# Patient Record
Sex: FEMALE | Race: WHITE | NOT HISPANIC OR LATINO | Employment: STUDENT | URBAN - METROPOLITAN AREA
[De-identification: names, ages, dates, MRNs, and addresses within clinical notes are randomized per-mention and may not be internally consistent; named-entity substitution may affect disease eponyms.]

---
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
## 2025-04-15 NOTE — PROGRESS NOTES
[facility]  Faraz Moyer  2005    Faraz Moyer is a 19 y.o. female presenting for follow-up of   1. Dysautonomia    2. Palpitations    3. Orthostatic dizziness    4. Chest pain in patient younger than 17 years        HPI  Faraz Moyer was initially sent for cardiac evaluation in June of 2018 for tachycardia.  She was diagnosed with syncope, orthostatic dizziness. There was a lapse in care. She was last seen 9/11/24. She reported tachycardia 1 year prior and OSH holter was normal at that time. She reported continued palpations, chest pain, dizziness, visual disturbances, nausea, brain fog, palpitations, shortness of breath, dry eyes and mouth, constipation, and difficulty sleeping. She was drinking a lot of caffeine. No murmur noted. EKG with low voltage. Dysautonomia protocol was reviewed. She was given a 6 month follow up.     She saw rheumatology for postive BAM In 2021 . She has not followed up with rheumatology.    She reports 1.5 months ago, she developed knees swelling and whelps.  She has a lot of pain in her knees when this occurs.  The swelling was followed by bruising. The swelling will spread from her upper legs to her feet.  Her PCP has started her on HCTZ 12.5 daily. She has had swelling since being on HCTZ. Will refer back to rheumatology.     Faraz has been doing well since last visit.  She is droing much better from a dysautonomia standpoint. She had cut back on caffeine. She is drinking more water.  Faraz has a lot of energy and does not get short of breath with activity.  Denies any recent illness, surgeries, or hospitalizations.    There are no reports of chest pain, chest pain with exertion, cyanosis, exercise intolerance, dyspnea, fatigue, palpitations, syncope, and tachypnea. No other cardiovascular or medical concerns are reported.      Medications:   Medication List with Changes/Refills   Current Medications    DESOGESTREL-ETHINYL ESTRADIOL (APRI)  0.15-0.03 MG PER TABLET    Take 1 tablet by mouth once daily.    HYDROCHLOROTHIAZIDE 12.5 MG TAB    Take 12.5 mg by mouth daily as needed.    LORATADINE (CLARITIN) 10 MG TABLET    Take 10 mg by mouth once daily.    LORATADINE-D 5-120 MG PER TABLET    Take 1 tablet by mouth 2 (two) times daily as needed.    MONTELUKAST (SINGULAIR) 10 MG TABLET    Take 10 mg by mouth once daily.    OMEPRAZOLE (PRILOSEC) 40 MG CAPSULE    Take 40 mg by mouth. Taking at night   Discontinued Medications    ADDERALL XR 10 MG 24 HR CAPSULE    Take 10 capsules by mouth once daily.    CLONIDINE (CATAPRES) 0.2 MG TABLET    Take 1 tablet by mouth nightly as needed.    DOC-Q-LACE 100 MG CAPSULE    Take 100 mg by mouth once daily.      Allergies:   Review of patient's allergies indicates:   Allergen Reactions    Azithromycin     Doxycycline     Erythromycin     Peanut     Shrimp      Not allergic to shrimp it was the seasoning    Augmentin [amoxicillin-pot clavulanate] Itching and Rash    Hydrocodone Nausea And Vomiting and Rash    Strattera [atomoxetine] Hives and Rash     Family History   Problem Relation Name Age of Onset    Anemia Mother      Childhood respiratory disease Mother          asthma    Congenital heart disease Mother          murmur    Premature birth Mother          34 weeks    Diabetes Mother      Childhood respiratory disease Sister          asthma    Premature birth Sister          31 weeks    Seizures Sister      Arrhythmia Maternal Aunt          POTS, EP study    Anemia Maternal Grandmother      Arrhythmia Maternal Grandmother          tachycardia and A-fib    Heart attacks under age 50 Maternal Grandmother      Hypertension Maternal Grandmother      Anemia Maternal Grandfather      Arrhythmia Maternal Grandfather          irregular heart beat took medicine    Hypertension Maternal Grandfather      Hypertension Paternal Grandfather      Cardiomyopathy Neg Hx      Clotting disorder Neg Hx      Deafness Neg Hx      Early death  "Neg Hx      Long QT syndrome Neg Hx      Pacemaker/defibrilator Neg Hx      SIDS Neg Hx       Past Medical History:   Diagnosis Date    ADD (attention deficit disorder)     BAM positive 2021    W/O evidence of autoimmune disease    Asthma     Chest pain     Dysautonomia     Fibromyalgia syndrome 2021    Hyperlipidemia     Murmur 2025    Orthostatic dizziness     Palpitations     Respiratory syncytial virus (RSV)     Syncope, vasovagal 2015    x5, starting at age 9     Social History     Social History Narrative    Faraz lives with mom and step dad.       Past Surgical History:   Procedure Laterality Date    COLONOSCOPY  2018    TONSILLECTOMY, ADENOIDECTOMY      TYMPANOSTOMY TUBE PLACEMENT      TYMPANOSTOMY TUBE PLACEMENT      TYMPANOSTOMY TUBE PLACEMENT       Birth History    Birth     Length: 1' 8.25" (0.514 m)     Weight: 3.6 kg (7 lb 15 oz)    Delivery Method: , Unspecified    Gestation Age: 39 wks     Nucchal cord, decelerations.  Emergency        There is no immunization history on file for this patient.  Immunizations were reviewed today and if not current, recommend follow up with the PCP for further management.  Past medical history, family history, surgical history, social history updated and reviewed today.     Review of Systems  GENERAL: No fever, chills, fatigability, malaise, or weight loss.  CHEST: Denies MALDONADO, cyanosis, wheezing, cough, sputum production, or SOB.  CARDIOVASCULAR: Denies chest pain, palpitations, diaphoresis, SOB, or reduced exercise tolerance.  Endocrine: Denies polyphagia, polydipsia, or polyuria  Skin:+ rash,  Denies ror color change  HENT: Negative for congestion, headaches and sore throat.   ABDOMEN: Appetite fine. No weight loss. Denies diarrhea, abdominal pain, nausea, or vomiting.  PERIPHERAL VASCULAR: No edema, varicosities, or cyanosis.  Musculoskeletal: + joint swelling Negative for muscle weakness and stiffness.  NEUROLOGIC: " "no dizziness, no history of syncope by report, no headache   Psychiatric/Behavioral: Negative for altered mental status. The patient is not nervous/anxious.   Allergic/Immunologic: Negative for environmental allergies.   : dysuria, hematuria, polyuria    Objective:   /60 (BP Location: Right arm, Patient Position: Sitting)   Pulse 76   Resp 18   Ht 5' 2" (1.575 m)   Wt 56.7 kg (124 lb 14.3 oz)   SpO2 97%   BMI 22.84 kg/m²   Body surface area is 1.57 meters squared.  Blood pressure %jessica are not available for patients who are 18 years or older.    Physical Exam  GENERAL: Awake, well-developed well-nourished, no apparent distress  HEENT: mucous membranes moist and pink, normocephalic, no cranial or carotid bruits, sclera anicteric  NECK:  no lymphadenopathy  CHEST: Good air movement, clear to auscultation bilaterally  CARDIOVASCULAR: Quiet precordium, regular rate and rhythm, single S1, split S2, normal P2, No S3 or S4, no rubs or gallops. No clicks or rumbles. No cardiomegaly by palpation.Grade 1 /6 possible regurgitant murmur noted at the apex. There is a grade 1/6 diastolic murmur at the ULSB- possible AI vs PI  ABDOMEN: Soft, nontender nondistended, no hepatosplenomegaly, no aortic bruits  EXTREMITIES: Warm well perfused, 2+ radial/pedal/femoral pulses, capillary refill 2 seconds, no clubbing, cyanosis, or edema  NEURO: Alert and oriented, cooperative with exam, face symmetric, moves all extremities well.  Skin: pink, turgor WNL  Vitals reviewed     Tests:   9/11/25 EKG interpretation by Dr. Kim reveals:   Sinus rhythm with marked sinus arrythmia    rS V1   Low voltage V leads   Abnormal ECG   (Final report in electronic medical record)    Echocardiogram:   Pertinent findings from the Echo dated 7/26/2018 are:   Normal segmental anatomy.  Normal biventricular size and qualitatively normal systolic function.  No obvious atrial septal defect, ventricular septal defect, or patent ductus  arteriosus.  No " significant valvular stenosis or regurgitation.  No evidence of aortic coarctation.  No pericardial effusion.  (Full report in electronic medical record)    Assessment:  Patient Active Problem List   Diagnosis    Syncope, vasovagal    Rash    Joint swelling    Murmur    Abnormal EKG       Discussion/ Plan:   Dr. Kim reviewed history and physical exam. He then performed the physical exam. He discussed the findings with the patient's caregiver(s), and answered all questions. Dr. Kim and I have reviewed our general guidelines related to cardiac issues with the family.  I instructed them in the event of an emergency to call 911 or go to the nearest emergency room.  They know to contact the PCP if problems arise or if they are in doubt.    Her exam today is suspect with new murmurs. There is questionable MR at the apex. There is also a murmur suspect for AI vs PI. Will do an echo for evaluation. Dr. Kim will walk back and look at echo when she is here and determine if she needs SIE.    EKG with low voltage at last visit. Will do an echo. Will repeat EKG at next visit.     She saw rheumatology for postive BAM. In 2021 . She has not followed up with rheumatology.She reports 1.5 months ago, she developed knees swelling and whelps.  She has a lot of pain in her knees when this occurs. The swelling was followed by bruising. The swelling will spread from her upper legs to her feet.  Her PCP has started her on HCTZ 12.5 daily. She has had swelling since being on HCTZ. Will refer back to rheumatology.     We have discussed dysautonomia at length today which is well controlled.  Dysautonomia is a term used to describe a multitude of symptoms that can occur with dysfunction of the autonomic nervous system. The autonomic nervous system serves as the main communication link between the brain and the organs without conscious effort. There are different types of dysautonomia including postural orthostatic tachycardia syndrome  (POTS), orthostatic hypotension (OH), and myalgic encephalomyelitis (ME) which is also known as chronic fatigue syndrome (CFS). Dysautonomia causes many symptoms that vary from person to person and can range in severity.  Common symptoms include: severe dizziness and fainting, headaches, severe fatigue, difficulty with concentration, heat or cold intolerance, palpitations, chest pain, weakness, venous pooling, nausea, vomiting, abdominal discomfort, and joint/muscle pain.  In part, these symptoms can be managed with a combination of non-pharmacologic interventions, including ensuring adequate fluid and salt intake, not skipping meals, limiting caffeine, self-limiting activities, and medications. There is nothing magic that we can do to make all of the symptoms go away.  The hope is to reduce symptoms so that important things such as the activities of daily living and education may be easier. It is important to know that symptoms may vary from hour to hour, day to day, throughout the month (especially for females), and throughout the year. Symptoms may abruptly start and are sometimes triggered by illnesses such as mono or flu.  Different people can have different combinations of symptoms. It is important to keep a daily log including fluid intake and symptoms. Protocol and guidelines were reviewed and include no dark water swimming without a life vest, no clear water swimming without a life vest and/or strict  and/or adult supervision.  If syncope or presyncope is experienced, they are to resume a position of comfort, either sitting or laying down.  I also suggested they elevate their feet 6 inches above their head.     Dysautonomia symptoms can be controlled by using a combination of medications and nonpharmacologic treatments which include:  Drink 80+ ounces of fluid (tap water, Propel, Gatorade, G2, Powerade, Powerade zero, Splash) each day, and have a salty snack (pretzels, saltines, pickles).    Dont  skip meals. Recommend eating 5-6 small meals a day.  Avoid large meals that contain a lot of carbohydrates which may exacerbate your symptoms.   No caffeine (its a diuretic, so it makes you urinate and empty your tank of fluid)  Raise the head of your bed 4-6 inches on something firm to help reduce dizziness in the morning when you get up  Insomnia can be treated with good sleep habits:  Lower the lights one hour before bedtime  Do a relaxing activity, such as reading under low light, massage, meditation, yoga, stretching, or a warm bath.    Turn off the television, computer and video games, and stop cell phone use.  When it is time for bed, the room should be dark (no night lights) and cool, but not cold.  Avoid triggers that worsen symptoms:  Have a consistent bedtime and amount of sleep (10-14 hours for adolescents).  Avoid extreme heat or cold.  Avoid stressful situations if possible  Wear compression stockings (30-40 mmHg) which should extend from waist to toe. They should be worn during awake hours.  A cooling vest is a vest with gel inserts that can be cooled in the freezer, then inserted into the vest and worn when it is hot outside.  There are also evaporative cooling vests, as well.  Patients who cannot tolerate the heat often appreciate these.     Coping with a chronic disease is stressful.  Many families find it helpful to see a mental health provider.    Participating in exercise is critical to the successful management of dysautonomia, and to the long-term improvement and resolution of symptoms.  Start with a small amount of leg and core strengthening exercise, such as 5 minutes/day, and increasing by 5 minutes/day every week up to 30 to 60 minutes/day. Despite possible initial worsening of symptoms and decreased overall energy, we recommend to try to push through as best as possible.  If needed, you may take a two-day break, and then resume exercise at a lower duration and/or intensity, and work  back up to following the protocol. Again, this is a vital part of the program and is important for you to follow.  Failure to exercise regularly makes it difficult for us to help you manage your  symptoms and may contribute to ongoing problems and quality of life.     Caregiver instructed to call one week after testing for results. Caregiver expressed understanding.     I spent a total of 30 minutes on the day of the visit.  This includes face to face time and non-face to face time preparing to see the patient (eg, review of tests), obtaining and/or reviewing separately obtained history, documenting clinical information in the electronic or other health record, independently interpreting results and communicating results to the patient/family/caregiver, or care coordinator.     Activity:She can participate in normal age-appropriate activities. She should be allowed to set .his own pace and rest if fatigued. If Faraz becomes lightheaded or feels as if she may pass out, patient should assume a position of comfort immediately (sit down or lie down) until the feeling passes. Do not make them walk somewhere to sit down. Please allow the patient to drink 60-100oz of fluids (Gatorade/Powerade/tap water/Splash/Propel) and eat salty snacks throughout the day (both at home and at school) to minimize the likeliness of dizziness. Please allow frequent bathroom breaks due to increased fluid intake. There should be no dark water swimming without a life vest and there should be no clear water swimming without adult or  supervision.     No endocarditis prophylaxis is recommended in this circumstance PENDING ECHO      Medications:   Medication List with Changes/Refills   Current Medications    DESOGESTREL-ETHINYL ESTRADIOL (APRI) 0.15-0.03 MG PER TABLET    Take 1 tablet by mouth once daily.    HYDROCHLOROTHIAZIDE 12.5 MG TAB    Take 12.5 mg by mouth daily as needed.    LORATADINE (CLARITIN) 10 MG TABLET    Take 10 mg by  mouth once daily.    LORATADINE-D 5-120 MG PER TABLET    Take 1 tablet by mouth 2 (two) times daily as needed.    MONTELUKAST (SINGULAIR) 10 MG TABLET    Take 10 mg by mouth once daily.    OMEPRAZOLE (PRILOSEC) 40 MG CAPSULE    Take 40 mg by mouth. Taking at night   Discontinued Medications    ADDERALL XR 10 MG 24 HR CAPSULE    Take 10 capsules by mouth once daily.    CLONIDINE (CATAPRES) 0.2 MG TABLET    Take 1 tablet by mouth nightly as needed.    DOC-Q-LACE 100 MG CAPSULE    Take 100 mg by mouth once daily.         Orders placed this encounter  Orders Placed This Encounter   Procedures    Ambulatory referral/consult to Rheumatology    EKG 12-lead    Pediatric Echo Limited Echo? No       Follow-Up:   Return to clinic in 1 year with EKG pending echo or sooner if there are any concerns    Sincerely,  Jay Kim MD    Note Contributing Authors:  MD Megan Blevins PA-C  04/17/2025    Attestation: Jay Kim MD  I have reviewed the records and agree with the above. I have examined the patient and discussed the findings with the family in attendance. All questions were answered to their satisfaction. I agree with the plan and the follow up instructions.

## 2025-04-17 ENCOUNTER — CLINICAL SUPPORT (OUTPATIENT)
Dept: PEDIATRIC CARDIOLOGY | Facility: CLINIC | Age: 20
End: 2025-04-17
Payer: MEDICAID

## 2025-04-17 ENCOUNTER — OFFICE VISIT (OUTPATIENT)
Dept: PEDIATRIC CARDIOLOGY | Facility: CLINIC | Age: 20
End: 2025-04-17
Payer: MEDICAID

## 2025-04-17 VITALS
HEART RATE: 76 BPM | RESPIRATION RATE: 18 BRPM | DIASTOLIC BLOOD PRESSURE: 60 MMHG | SYSTOLIC BLOOD PRESSURE: 102 MMHG | BODY MASS INDEX: 22.98 KG/M2 | WEIGHT: 124.88 LBS | OXYGEN SATURATION: 97 % | HEIGHT: 62 IN

## 2025-04-17 DIAGNOSIS — M25.40 JOINT SWELLING: ICD-10-CM

## 2025-04-17 DIAGNOSIS — R55 SYNCOPE, VASOVAGAL: ICD-10-CM

## 2025-04-17 DIAGNOSIS — R01.1 MURMUR: ICD-10-CM

## 2025-04-17 DIAGNOSIS — R94.31 ABNORMAL EKG: ICD-10-CM

## 2025-04-17 DIAGNOSIS — M25.40 JOINT SWELLING: Primary | ICD-10-CM

## 2025-04-17 DIAGNOSIS — R94.31 ABNORMAL EKG: Primary | ICD-10-CM

## 2025-04-17 DIAGNOSIS — R21 RASH: ICD-10-CM

## 2025-04-17 PROBLEM — R00.2 PALPITATIONS: Status: RESOLVED | Noted: 2018-07-26 | Resolved: 2025-04-17

## 2025-04-17 PROBLEM — R07.9 CHEST PAIN IN PATIENT YOUNGER THAN 17 YEARS: Status: RESOLVED | Noted: 2018-07-26 | Resolved: 2025-04-17

## 2025-04-17 PROBLEM — R42 ORTHOSTATIC DIZZINESS: Status: RESOLVED | Noted: 2018-07-26 | Resolved: 2025-04-17

## 2025-04-17 PROCEDURE — 99214 OFFICE O/P EST MOD 30 MIN: CPT | Mod: S$GLB,,, | Performed by: PHYSICIAN ASSISTANT

## 2025-04-17 PROCEDURE — 3008F BODY MASS INDEX DOCD: CPT | Mod: CPTII,S$GLB,, | Performed by: PHYSICIAN ASSISTANT

## 2025-04-17 PROCEDURE — 1160F RVW MEDS BY RX/DR IN RCRD: CPT | Mod: CPTII,S$GLB,, | Performed by: PHYSICIAN ASSISTANT

## 2025-04-17 PROCEDURE — 1159F MED LIST DOCD IN RCRD: CPT | Mod: CPTII,S$GLB,, | Performed by: PHYSICIAN ASSISTANT

## 2025-04-17 PROCEDURE — 3078F DIAST BP <80 MM HG: CPT | Mod: CPTII,S$GLB,, | Performed by: PHYSICIAN ASSISTANT

## 2025-04-17 PROCEDURE — 3074F SYST BP LT 130 MM HG: CPT | Mod: CPTII,S$GLB,, | Performed by: PHYSICIAN ASSISTANT

## 2025-04-17 RX ORDER — HYDROCHLOROTHIAZIDE 12.5 MG/1
12.5 TABLET ORAL DAILY PRN
COMMUNITY
Start: 2025-04-08

## 2025-04-17 NOTE — PATIENT INSTRUCTIONS
Jay Kim MD  Pediatric Cardiology  [address] [place] [address]  [phone]    General Guidelines    Name: Faraz Moyer                   : 2005    Diagnosis:   1. Joint swelling    2. Rash    3. Murmur        PCP: Tomás Miranda MD  PCP Phone Number: [phone]    If you have an emergency or you think you have an emergency, go to the nearest emergency room!     Breathing too fast, doesnt look right, consistently not eating well, your child needs to be checked. These are general indications that your child is not feeling well. This may be caused by anything, a stomach virus, an ear ache or heart disease, so please call Tomás Miranda MD. If Tomás Miranda MD thinks you need to be checked for your heart, they will let us know.     If your child experiences a rapid or very slow heart rate and has the following symptoms, call Tomás Miranda MD or go to the nearest emergency room.   unexplained chest pain   does not look right   feels like they are going to pass out   actually passes out for unexplained reasons   weakness or fatigue   shortness of breath  or breathing fast   consistent poor feeding     If your child experiences a rapid or very slow heart rate that lasts longer than 30 minutes call Tomás Miranda MD or go to the nearest emergency room.     If your child feels like they are going to pass out - have them sit down or lay down immediately. Raise the feet above the head (prop the feet on a chair or the wall) until the feeling passes. Slowly allow the child to sit, then stand. If the feeling returns, lay back down and start over.     It is very important that you notify Tomás Miranda MD first. Tomás Miranda MD or the ER Physician can reach Dr. Jay Kim at the office or through [facility] at [phone] as needed.    Call our office ([phone]) one week after ALL tests for results.     For appointments at  [facility] rheumatology, please call [phone] or [phone].

## 2025-04-21 ENCOUNTER — RESULTS FOLLOW-UP (OUTPATIENT)
Dept: PEDIATRIC CARDIOLOGY | Facility: CLINIC | Age: 20
End: 2025-04-21
Payer: MEDICAID

## 2025-04-29 ENCOUNTER — TELEPHONE (OUTPATIENT)
Dept: PEDIATRIC CARDIOLOGY | Facility: CLINIC | Age: 20
End: 2025-04-29
Payer: MEDICAID

## 2025-04-29 NOTE — TELEPHONE ENCOUNTER
Phoned Faraz and reviewed echo:  There are 4 chambers with normally aligned great vessels.  Chamber sizes are qualitatively normal.  There is good LV function.  There are no shunts noted.  Physiological TR, PI.  The right coronary artery and left coronary are patent by 2D.  MV E/A 4.1, hyperfiller  RVID 2.4  No AI, No MR noted  LA Volume 19 ml/m2  RVSP 25 mmHg  LV lateral tissue doppler data WNL  TAPSE 2.3 cm  D. aorta PG 7 mmHg  No cardiac disease identified on this study  Faraz verbalizes understanding.    ----- Message from Med Assistant Annia sent at 4/29/2025  4:21 PM CDT -----  This Pt was calling to get echo results. [phone] Thank You